# Patient Record
Sex: FEMALE | Race: ASIAN | URBAN - METROPOLITAN AREA
[De-identification: names, ages, dates, MRNs, and addresses within clinical notes are randomized per-mention and may not be internally consistent; named-entity substitution may affect disease eponyms.]

---

## 2021-08-05 ENCOUNTER — OFFICE VISIT (OUTPATIENT)
Dept: URBAN - METROPOLITAN AREA CLINIC 23 | Facility: CLINIC | Age: 56
End: 2021-08-05

## 2021-08-09 ENCOUNTER — OFFICE VISIT (OUTPATIENT)
Dept: URBAN - METROPOLITAN AREA TELEHEALTH 2 | Facility: TELEHEALTH | Age: 56
End: 2021-08-09

## 2021-08-25 ENCOUNTER — OFFICE VISIT (OUTPATIENT)
Dept: URBAN - METROPOLITAN AREA CLINIC 12 | Facility: CLINIC | Age: 56
End: 2021-08-25
Payer: COMMERCIAL

## 2021-08-25 ENCOUNTER — DASHBOARD ENCOUNTERS (OUTPATIENT)
Age: 56
End: 2021-08-25

## 2021-08-25 DIAGNOSIS — K21.9 GERD: ICD-10-CM

## 2021-08-25 DIAGNOSIS — Z12.11 SCREEN FOR COLON CANCER: ICD-10-CM

## 2021-08-25 DIAGNOSIS — R14.2 BURPING: ICD-10-CM

## 2021-08-25 PROBLEM — 235595009 GASTROESOPHAGEAL REFLUX DISEASE: Status: ACTIVE | Noted: 2021-08-25

## 2021-08-25 PROBLEM — 305058001: Status: ACTIVE | Noted: 2021-08-25

## 2021-08-25 PROCEDURE — 99204 OFFICE O/P NEW MOD 45 MIN: CPT | Performed by: INTERNAL MEDICINE

## 2021-08-25 PROCEDURE — 99244 OFF/OP CNSLTJ NEW/EST MOD 40: CPT | Performed by: INTERNAL MEDICINE

## 2021-08-25 NOTE — HPI-TODAY'S VISIT:
54 yo female presenting complaining of 'wet burps' for 4-5 years- referred by Dr. Elisabeth Valdivia . A copy of this note will be sent to the referring physician.  has tried ppi , recently tried pantoprazole with her PCP which didn't help -she had egd  in 2017 - was done in [place] - had mild inflammation otherwise normal.  she states that she was referred for repeat egd at this time given ongoing  she feels that the burping is new and different before -she had a barium esophagram which showed mild stricture and hx of surgery -she had a surgery in 2011 for 'benign tissue seen outside her esophagus' there is no abdominal pain at all -she states that it doesn't  matter if she eats or nt. states there is the sound of refluxing of liquid.   -there is no nausea, vomiting at all she states that there are no changes in her weight she has not had her colonoscopy -states that she eats spicy food as well  -denies fh of colon cancer/polyps denies any hx of heart or lung disease

## 2021-09-20 ENCOUNTER — OFFICE VISIT (OUTPATIENT)
Dept: URBAN - METROPOLITAN AREA CLINIC 23 | Facility: CLINIC | Age: 56
End: 2021-09-20

## 2021-11-09 ENCOUNTER — OFFICE VISIT (OUTPATIENT)
Dept: URBAN - METROPOLITAN AREA SURGERY CENTER 15 | Facility: SURGERY CENTER | Age: 56
End: 2021-11-09